# Patient Record
Sex: FEMALE | Race: WHITE | NOT HISPANIC OR LATINO | URBAN - METROPOLITAN AREA
[De-identification: names, ages, dates, MRNs, and addresses within clinical notes are randomized per-mention and may not be internally consistent; named-entity substitution may affect disease eponyms.]

---

## 2023-12-16 ENCOUNTER — HOSPITAL ENCOUNTER (EMERGENCY)
Facility: HOSPITAL | Age: 48
Discharge: HOME | End: 2023-12-16
Attending: EMERGENCY MEDICINE

## 2023-12-16 VITALS
OXYGEN SATURATION: 95 % | SYSTOLIC BLOOD PRESSURE: 120 MMHG | DIASTOLIC BLOOD PRESSURE: 82 MMHG | HEART RATE: 69 BPM | WEIGHT: 229.25 LBS | RESPIRATION RATE: 18 BRPM
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
## 2023-12-16 DIAGNOSIS — S82.832A CLOSED FRACTURE OF DISTAL END OF LEFT FIBULA, UNSPECIFIED FRACTURE MORPHOLOGY, INITIAL ENCOUNTER: ICD-10-CM
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
## 2023-12-16 DIAGNOSIS — S82.441A DISPLACED SPIRAL FRACTURE OF SHAFT OF RIGHT FIBULA, INITIAL ENCOUNTER FOR CLOSED FRACTURE: ICD-10-CM

## 2023-12-16 PROCEDURE — 63600175 PHARM REV CODE 636 W HCPCS: Performed by: EMERGENCY MEDICINE

## 2023-12-16 PROCEDURE — 96374 THER/PROPH/DIAG INJ IV PUSH: CPT

## 2023-12-16 PROCEDURE — 96375 TX/PRO/DX INJ NEW DRUG ADDON: CPT

## 2023-12-16 PROCEDURE — 99284 EMERGENCY DEPT VISIT MOD MDM: CPT | Mod: 25

## 2023-12-16 RX ORDER — ONDANSETRON 2 MG/ML
4 INJECTION INTRAMUSCULAR; INTRAVENOUS
Status: COMPLETED | OUTPATIENT
Start: 2023-12-16 | End: 2023-12-16

## 2023-12-16 RX ORDER — KETOROLAC TROMETHAMINE 30 MG/ML
30 INJECTION, SOLUTION INTRAMUSCULAR; INTRAVENOUS
Status: COMPLETED | OUTPATIENT
Start: 2023-12-16 | End: 2023-12-16

## 2023-12-16 RX ORDER — ONDANSETRON 4 MG/1
4 TABLET, FILM COATED ORAL EVERY 8 HOURS PRN
Qty: 12 TABLET | Refills: 0 | Status: SHIPPED | OUTPATIENT
Start: 2023-12-16

## 2023-12-16 RX ORDER — HYDROCODONE BITARTRATE AND ACETAMINOPHEN 10; 325 MG/1; MG/1
1 TABLET ORAL EVERY 6 HOURS PRN
Qty: 12 TABLET | Refills: 0 | Status: SHIPPED | OUTPATIENT
Start: 2023-12-16 | End: 2023-12-16

## 2023-12-16 RX ORDER — IBUPROFEN 600 MG/1
600 TABLET ORAL EVERY 6 HOURS PRN
Qty: 20 TABLET | Refills: 0 | Status: SHIPPED | OUTPATIENT
Start: 2023-12-16

## 2023-12-16 RX ORDER — HYDROMORPHONE HYDROCHLORIDE 2 MG/ML
1 INJECTION, SOLUTION INTRAMUSCULAR; INTRAVENOUS; SUBCUTANEOUS
Status: COMPLETED | OUTPATIENT
Start: 2023-12-16 | End: 2023-12-16

## 2023-12-16 RX ORDER — HYDROCODONE BITARTRATE AND ACETAMINOPHEN 10; 325 MG/1; MG/1
1 TABLET ORAL EVERY 6 HOURS PRN
Qty: 28 TABLET | Refills: 0 | Status: SHIPPED | OUTPATIENT
Start: 2023-12-16

## 2023-12-16 RX ADMIN — KETOROLAC TROMETHAMINE 30 MG: 30 INJECTION, SOLUTION INTRAMUSCULAR; INTRAVENOUS at 09:12

## 2023-12-16 RX ADMIN — ONDANSETRON 4 MG: 2 INJECTION INTRAMUSCULAR; INTRAVENOUS at 08:12

## 2023-12-16 RX ADMIN — HYDROMORPHONE HYDROCHLORIDE 1 MG: 2 INJECTION INTRAMUSCULAR; INTRAVENOUS; SUBCUTANEOUS at 08:12

## 2023-12-17 NOTE — DISCHARGE INSTRUCTIONS
Rest , elevate both legs/ankles as much as possible; return for increase in pain, numbness to toes/feet or if turn colors

## 2023-12-17 NOTE — ED PROVIDER NOTES
"Encounter Date: 12/16/2023    SCRIBE #1 NOTE: I, Kristy Coelho, am scribing for, and in the presence of,  Zulema Varma MD. I have scribed the following portions of the note - Other sections scribed: HPI, ROS, PE.       History     Chief Complaint   Patient presents with    Ankle Pain     Pt. Arrives via EMS c/o fall [place] step  [place] with [place].. noted deformity to bilat ankles.. denies blood thinners, loc.. +2 pulses bilat.. aaox4 upon arrival..      49 y/o WF presents to the ED via EMS for bilateral ankle pain following a fall. Patient reports she was walking down some steps [place] when she reports she  "missed the last step" and fell down. Upon falling, patient notes hearing a "crack." She denies associated neck pain or back pain.  Did not hit her head.  No LOC.   She was given 200 mcg of fentanyl en route to the ED.    The history is provided by the patient. No  was used.     Review of patient's allergies indicates:   Allergen Reactions    Codeine     Latex, natural rubber      No past medical history on file.  No past surgical history on file.  No family history on file.     Review of Systems   Constitutional: Negative.    HENT: Negative.     Respiratory: Negative.     Cardiovascular: Negative.    Gastrointestinal: Negative.    Musculoskeletal:  Negative for back pain and neck pain.        Bilateral ankle pain     Neurological: Negative.        Physical Exam     Initial Vitals [12/16/23 1825]   BP Pulse Resp Temp SpO2   134/78 79 18 -- 98 %      MAP       --         Physical Exam    Constitutional: She appears well-developed and well-nourished. She is not diaphoretic. No distress.   Obese white female   HENT:   Head: Normocephalic and atraumatic.   Eyes: Conjunctivae and EOM are normal. Pupils are equal, round, and reactive to light.   Neck: Neck supple.   Normal range of motion.  Cardiovascular:  Normal rate, regular rhythm and intact distal pulses.      "      Pulses:       Dorsalis pedis pulses are 2+ on the right side and 2+ on the left side.   Feet warm; not cold or pale; good pulses;   Pulmonary/Chest: Breath sounds normal.   Abdominal: Abdomen is soft. Bowel sounds are normal.   Musculoskeletal:      Cervical back: Normal range of motion and neck supple.      Comments: Swelling and bruising to bilateral lateral malleolus; no obvious dislocation on initial assessment  No open wound, abrasion, or lacerations on ankles or feet; there is a small abrasion on RLE mid leg; able to wiggle toes  No knee tenderness or swelling.     Neurological: She is alert and oriented to person, place, and time. She has normal strength. GCS eye subscore is 4. GCS verbal subscore is 5. GCS motor subscore is 6.   Skin: Skin is warm and dry. Capillary refill takes less than 2 seconds.   Psychiatric: She has a normal mood and affect.         ED Course   Procedures  Labs Reviewed - No data to display       Imaging Results              X-Ray Ankle Complete Right (Preliminary result)  Result time 12/16/23 20:17:30      Wet Read by Zulema Varma MD (12/16/23 20:17:30, [facility] - Emergency Dept, Emergency Medicine)    spiral fibular fracture distally                      Wet Read by Zulema Varma MD (12/16/23 20:17:18, [facility] - Emergency Dept, Emergency Medicine)    Viral fibular fracture distally                                     X-Ray Ankle Complete Left (Preliminary result)  Result time 12/16/23 20:17:46      Wet Read by Zulema Varma MD (12/16/23 20:17:46, [facility] - Emergency Dept, Emergency Medicine)    Distal fibula fx                                     Medications   ketorolac injection 30 mg (has no administration in time range)   ondansetron injection 4 mg (4 mg Intravenous Given 12/16/23 2010)   HYDROmorphone (PF) injection 1 mg (1 mg Intravenous Given 12/16/23 2009)     Medical Decision Making  Amount  and/or Complexity of Data Reviewed  Radiology: ordered.    Risk  Prescription drug management.            Scribe Attestation:   Scribe #1: I performed the above scribed service and the documentation accurately describes the services I performed. I attest to the accuracy of the note.    Attending Attestation:           Physician Attestation for Scribe:  Physician Attestation Statement for Scribe #1: I, Zulema Varma MD, reviewed documentation, as scribed by Kristy Coelho in my presence, and it is both accurate and complete.             ED Course as of 12/16/23 2020   Sat Dec 16, 2023   1955 DR Brooke reviewing films [RD]   1956 Ortho contacted me and states that she can wear a boot or Aircast on the left and can weightbear as tolerated.  She will need a splint on the right, nonweightbearing.  Can follow-up in the office [RD]   2016 I explained to patient the orthopedic doctor's instructions.  I think it would be best if she gets  a wheelchair .  She is going to check with her family member as she thinks someone may have 1.  I explained that she can be touchdown weight-bearing on the left side only as tolerated but she is not to be weight-bearing on the right side.  She voiced understanding. [RD]      ED Course User Index  [RD] Zulema Varma MD               Medical Decision Making:   Differential Diagnosis:   The differential diagnosis includes, but is not limited to: ankle sprain, fx, disclocations    Clinical Tests:   Radiological Study: Ordered and Reviewed  Other:   I have discussed this case with another health care provider.             Clinical Impression:  Final diagnoses:  [W19.XXXA] Fall  [W19.XXXA] Fall, initial encounter (Primary)  [S82.441A] Displaced spiral fracture of shaft of right fibula, initial encounter for closed fracture  [S82.832A] Closed fracture of distal end of left fibula, unspecified fracture morphology, initial encounter          ED Disposition Condition    Discharge  Stable          ED Prescriptions       Medication Sig Dispense Start Date End Date Auth. Provider    HYDROcodone-acetaminophen (NORCO)  mg per tablet  (Status: Discontinued) Take 1 tablet by mouth every 6 (six) hours as needed for Pain. 12 tablet 12/16/2023 12/16/2023 Zulema Varma MD    ondansetron (ZOFRAN) 4 MG tablet Take 1 tablet (4 mg total) by mouth every 8 (eight) hours as needed for Nausea. 12 tablet 12/16/2023 -- Zulema Varma MD    ibuprofen (ADVIL,MOTRIN) 600 MG tablet Take 1 tablet (600 mg total) by mouth every 6 (six) hours as needed for Pain. 20 tablet 12/16/2023 -- Zulema Varma MD    HYDROcodone-acetaminophen (NORCO)  mg per tablet Take 1 tablet by mouth every 6 (six) hours as needed for Pain. 28 tablet 12/16/2023 -- Zulema Varma MD          Follow-up Information       Follow up With Specialties Details Why Contact Theodore Simpson MD Orthopedic Surgery Schedule an appointment as soon as possible for a visit in 2 days  [address] [facility].  [address]  [facility] [id number] [phone]               Zulema Varma MD  12/16/23 2021